# Patient Record
Sex: MALE
[De-identification: names, ages, dates, MRNs, and addresses within clinical notes are randomized per-mention and may not be internally consistent; named-entity substitution may affect disease eponyms.]

---

## 2020-12-08 PROBLEM — Z00.00 ENCOUNTER FOR PREVENTIVE HEALTH EXAMINATION: Status: ACTIVE | Noted: 2020-12-08

## 2020-12-11 ENCOUNTER — APPOINTMENT (OUTPATIENT)
Dept: NEUROSURGERY | Facility: CLINIC | Age: 80
End: 2020-12-11
Payer: MEDICARE

## 2020-12-11 VITALS
HEART RATE: 68 BPM | OXYGEN SATURATION: 98 % | SYSTOLIC BLOOD PRESSURE: 186 MMHG | BODY MASS INDEX: 25.11 KG/M2 | TEMPERATURE: 97.8 F | WEIGHT: 160 LBS | HEIGHT: 67 IN | DIASTOLIC BLOOD PRESSURE: 93 MMHG

## 2020-12-11 DIAGNOSIS — M48.02 SPINAL STENOSIS, CERVICAL REGION: ICD-10-CM

## 2020-12-11 DIAGNOSIS — Z87.438 PERSONAL HISTORY OF OTHER DISEASES OF MALE GENITAL ORGANS: ICD-10-CM

## 2020-12-11 DIAGNOSIS — Z78.9 OTHER SPECIFIED HEALTH STATUS: ICD-10-CM

## 2020-12-11 DIAGNOSIS — Z86.19 PERSONAL HISTORY OF OTHER INFECTIOUS AND PARASITIC DISEASES: ICD-10-CM

## 2020-12-11 DIAGNOSIS — Z86.69 PERSONAL HISTORY OF OTHER DISEASES OF THE NERVOUS SYSTEM AND SENSE ORGANS: ICD-10-CM

## 2020-12-11 DIAGNOSIS — G62.9 POLYNEUROPATHY, UNSPECIFIED: ICD-10-CM

## 2020-12-11 PROCEDURE — 99072 ADDL SUPL MATRL&STAF TM PHE: CPT

## 2020-12-11 PROCEDURE — 99203 OFFICE O/P NEW LOW 30 MIN: CPT

## 2020-12-11 RX ORDER — FOLIC ACID 1 MG/1
1 TABLET ORAL
Refills: 0 | Status: ACTIVE | COMMUNITY
Start: 2020-12-11

## 2020-12-11 RX ORDER — GABAPENTIN 300 MG/1
300 CAPSULE ORAL
Refills: 0 | Status: ACTIVE | COMMUNITY
Start: 2020-12-11

## 2020-12-11 RX ORDER — AMITRIPTYLINE HYDROCHLORIDE 10 MG/1
10 TABLET, FILM COATED ORAL
Refills: 0 | Status: ACTIVE | COMMUNITY
Start: 2020-12-11

## 2020-12-14 PROBLEM — G62.9 PERIPHERAL NERVE DISORDER: Status: ACTIVE | Noted: 2020-12-14

## 2020-12-14 NOTE — PHYSICAL EXAM
[General Appearance - Alert] : alert [General Appearance - In No Acute Distress] : in no acute distress [General Appearance - Well Nourished] : well nourished [General Appearance - Well Developed] : well developed [General Appearance - Well-Appearing] : healthy appearing [] : normal voice and communication [Oriented To Time, Place, And Person] : oriented to person, place, and time [Person] : oriented to person [Place] : oriented to place [Time] : oriented to time [Short Term Intact] : short term memory intact [Remote Intact] : remote memory intact [Registration Intact] : recent registration memory intact [Span Intact] : the attention span was normal [Concentration Intact] : normal concentrating ability [Visual Intact] : visual attention was ~T not ~L decreased [Fluency] : fluency intact [Comprehension] : comprehension intact [Reading] : reading intact [Current Events] : adequate knowledge of current events [Past History] : adequate knowledge of personal past history [Vocabulary] : adequate range of vocabulary [Spurling's - Opposite Side] : Negative Spurling's on opposite side [Spurling's Same Side] : Negative Spurling's on same side [C-Spine ___ (level)] : no cervical spine tenderness [Left  Paraspinal ___ (level)] : not the left paraspinal [Right Paraspinal ___ (level)] : not the right paraspinal [Full] : Full [Normal] : Normal [Pain / Temp Decrease Sensory Level At Shoulders - Right Only] : C5 sensory impairment [Pain / Temp Decrease - Root / Radicular Distribution] : C6 sensory impairment [No Deficits] : no sensory deficits [Sclera] : the sclera and conjunctiva were normal [PERRL With Normal Accommodation] : pupils were equal in size, round, reactive to light, with normal accommodation [Extraocular Movements] : extraocular movements were intact

## 2020-12-14 NOTE — REASON FOR VISIT
[New Patient Visit] : a new patient visit [Referred By: _________] : Patient was referred by STEVIE [FreeTextEntry1] : Neck pain

## 2020-12-14 NOTE — HISTORY OF PRESENT ILLNESS
[___ yrs] : [unfilled] year(s) ago [4] : a minimum pain level of 4/10 [5] : a maximum pain level of 5/10 [Burning] : burning [Shooting] : shooting [Electric] : electric [Right] : right [Forearm] : forearm [Arm] : arm [Wrist] : wrist [Worsened] : have worsened [Acupuncture] : acupuncture [Injections] : injections [FreeTextEntry1] : neck pain  [Did the prior interventions help?] : The intervention(s) did not help [FreeTextEntry2] : Upper arm pain described as burning is the right- Night the pain is worst  [FreeTextEntry3] : touch  [FreeTextEntry6] : Cervical spine epidural injectionx3, 20 session of acupuncture, gabapentin. Patient was diagnosis with Shingles on June 11, 2018

## 2020-12-14 NOTE — ASSESSMENT
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 His cervical spine MRI does not explain his symptoms. 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

## 2020-12-14 NOTE — REVIEW OF SYSTEMS
[Abnormal Sensation] : an abnormal sensation [Hypersensitivity] : hypersensitivity [Negative] : Heme/Lymph [Arm Weakness] : no arm weakness [Hand Weakness] : no hand weakness [Numbness] : no numbness [Tingling] : no tingling [Difficulty Walking] : no difficulty walking [Inability to Walk] : able to walk [Ataxia] : no ataxia [Frequent Falls] : not falling [Limping] : not limping